# Patient Record
Sex: MALE | Race: WHITE | Employment: OTHER | URBAN - NONMETROPOLITAN AREA
[De-identification: names, ages, dates, MRNs, and addresses within clinical notes are randomized per-mention and may not be internally consistent; named-entity substitution may affect disease eponyms.]

---

## 2018-01-21 ENCOUNTER — HOSPITAL ENCOUNTER (INPATIENT)
Age: 38
LOS: 5 days | Discharge: HOME | DRG: 885 | End: 2018-01-26
Attending: EMERGENCY MEDICINE | Admitting: PSYCHIATRY & NEUROLOGY
Payer: COMMERCIAL

## 2018-01-21 DIAGNOSIS — I10 PRIMARY HYPERTENSION: ICD-10-CM

## 2018-01-21 DIAGNOSIS — F30.9 MANIA (HCC): Primary | ICD-10-CM

## 2018-01-21 DIAGNOSIS — F12.10 MARIJUANA ABUSE: ICD-10-CM

## 2018-01-21 LAB
ACETAMINOPHEN LEVEL: < 5 UG/ML (ref 0–20)
ALBUMIN SERPL-MCNC: 4.7 G/DL (ref 3.5–5.1)
ALP BLD-CCNC: 79 U/L (ref 38–126)
ALT SERPL-CCNC: 25 U/L (ref 11–66)
AMPHETAMINE+METHAMPHETAMINE URINE SCREEN: NEGATIVE
ANION GAP SERPL CALCULATED.3IONS-SCNC: 16 MEQ/L (ref 8–16)
AST SERPL-CCNC: 40 U/L (ref 5–40)
BACTERIA: ABNORMAL /HPF
BARBITURATE QUANTITATIVE URINE: NEGATIVE
BASOPHILS # BLD: 0.6 %
BASOPHILS ABSOLUTE: 0.1 THOU/MM3 (ref 0–0.1)
BENZODIAZEPINE QUANTITATIVE URINE: NEGATIVE
BILIRUB SERPL-MCNC: 0.5 MG/DL (ref 0.3–1.2)
BILIRUBIN URINE: NEGATIVE
BLOOD, URINE: NEGATIVE
BUN BLDV-MCNC: 12 MG/DL (ref 7–22)
CALCIUM SERPL-MCNC: 9.8 MG/DL (ref 8.5–10.5)
CANNABINOID QUANTITATIVE URINE: POSITIVE
CASTS 2: ABNORMAL /LPF
CASTS UA: ABNORMAL /LPF
CHARACTER, URINE: ABNORMAL
CHLORIDE BLD-SCNC: 98 MEQ/L (ref 98–111)
CO2: 25 MEQ/L (ref 23–33)
COCAINE METABOLITE QUANTITATIVE URINE: NEGATIVE
COLOR: YELLOW
CREAT SERPL-MCNC: 0.9 MG/DL (ref 0.4–1.2)
CRYSTALS, UA: ABNORMAL
EOSINOPHIL # BLD: 0.9 %
EOSINOPHILS ABSOLUTE: 0.1 THOU/MM3 (ref 0–0.4)
EPITHELIAL CELLS, UA: ABNORMAL /HPF
ETHYL ALCOHOL, SERUM: < 0.01 %
GFR SERPL CREATININE-BSD FRML MDRD: > 90 ML/MIN/1.73M2
GLUCOSE BLD-MCNC: 104 MG/DL (ref 70–108)
GLUCOSE URINE: NEGATIVE MG/DL
HCT VFR BLD CALC: 46.5 % (ref 42–52)
HEMOGLOBIN: 16.1 GM/DL (ref 14–18)
KETONES, URINE: NEGATIVE
LEUKOCYTE ESTERASE, URINE: NEGATIVE
LYMPHOCYTES # BLD: 11.5 %
LYMPHOCYTES ABSOLUTE: 1.6 THOU/MM3 (ref 1–4.8)
MCH RBC QN AUTO: 30 PG (ref 27–31)
MCHC RBC AUTO-ENTMCNC: 34.6 GM/DL (ref 33–37)
MCV RBC AUTO: 86.6 FL (ref 80–94)
MISCELLANEOUS 2: ABNORMAL
MONOCYTES # BLD: 4.6 %
MONOCYTES ABSOLUTE: 0.6 THOU/MM3 (ref 0.4–1.3)
NITRITE, URINE: NEGATIVE
NUCLEATED RED BLOOD CELLS: 0 /100 WBC
OPIATES, URINE: NEGATIVE
OSMOLALITY CALCULATION: 277.6 MOSMOL/KG (ref 275–300)
OXYCODONE: NEGATIVE
PDW BLD-RTO: 12.8 % (ref 11.5–14.5)
PH UA: 7.5
PHENCYCLIDINE QUANTITATIVE URINE: NEGATIVE
PLATELET # BLD: 333 THOU/MM3 (ref 130–400)
PMV BLD AUTO: 7.3 MCM (ref 7.4–10.4)
POTASSIUM SERPL-SCNC: 4.1 MEQ/L (ref 3.5–5.2)
PROTEIN UA: NEGATIVE
RBC # BLD: 5.37 MILL/MM3 (ref 4.7–6.1)
RBC URINE: ABNORMAL /HPF
RENAL EPITHELIAL, UA: ABNORMAL
SALICYLATE, SERUM: < 0.3 MG/DL (ref 2–10)
SEG NEUTROPHILS: 82.4 %
SEGMENTED NEUTROPHILS ABSOLUTE COUNT: 11.5 THOU/MM3 (ref 1.8–7.7)
SODIUM BLD-SCNC: 139 MEQ/L (ref 135–145)
SPECIFIC GRAVITY, URINE: < 1.005 (ref 1–1.03)
TOTAL PROTEIN: 7.9 G/DL (ref 6.1–8)
TSH SERPL DL<=0.05 MIU/L-ACNC: 2.58 UIU/ML (ref 0.4–4.2)
UROBILINOGEN, URINE: 0.2 EU/DL
WBC # BLD: 13.9 THOU/MM3 (ref 4.8–10.8)
WBC UA: ABNORMAL /HPF
YEAST: ABNORMAL

## 2018-01-21 PROCEDURE — 84443 ASSAY THYROID STIM HORMONE: CPT

## 2018-01-21 PROCEDURE — 99284 EMERGENCY DEPT VISIT MOD MDM: CPT

## 2018-01-21 PROCEDURE — 85025 COMPLETE CBC W/AUTO DIFF WBC: CPT

## 2018-01-21 PROCEDURE — G0480 DRUG TEST DEF 1-7 CLASSES: HCPCS

## 2018-01-21 PROCEDURE — 36415 COLL VENOUS BLD VENIPUNCTURE: CPT

## 2018-01-21 PROCEDURE — 80307 DRUG TEST PRSMV CHEM ANLYZR: CPT

## 2018-01-21 PROCEDURE — 80053 COMPREHEN METABOLIC PANEL: CPT

## 2018-01-21 PROCEDURE — 1240000000 HC EMOTIONAL WELLNESS R&B

## 2018-01-21 PROCEDURE — 81001 URINALYSIS AUTO W/SCOPE: CPT

## 2018-01-21 RX ORDER — TRAZODONE HYDROCHLORIDE 50 MG/1
50 TABLET ORAL NIGHTLY PRN
Status: DISCONTINUED | OUTPATIENT
Start: 2018-01-21 | End: 2018-01-26 | Stop reason: HOSPADM

## 2018-01-21 RX ORDER — LORAZEPAM 2 MG/ML
2 INJECTION INTRAMUSCULAR
Status: DISPENSED | OUTPATIENT
Start: 2018-01-21 | End: 2018-01-21

## 2018-01-21 RX ORDER — ZIPRASIDONE MESYLATE 20 MG/ML
20 INJECTION, POWDER, LYOPHILIZED, FOR SOLUTION INTRAMUSCULAR
Status: DISPENSED | OUTPATIENT
Start: 2018-01-21 | End: 2018-01-21

## 2018-01-21 RX ORDER — HYDROXYZINE PAMOATE 25 MG/1
25 CAPSULE ORAL 3 TIMES DAILY PRN
Status: DISCONTINUED | OUTPATIENT
Start: 2018-01-21 | End: 2018-01-26 | Stop reason: HOSPADM

## 2018-01-21 RX ORDER — NICOTINE 21 MG/24HR
1 PATCH, TRANSDERMAL 24 HOURS TRANSDERMAL EVERY 24 HOURS
Status: DISCONTINUED | OUTPATIENT
Start: 2018-01-21 | End: 2018-01-26 | Stop reason: HOSPADM

## 2018-01-21 RX ORDER — MAGNESIUM HYDROXIDE/ALUMINUM HYDROXICE/SIMETHICONE 120; 1200; 1200 MG/30ML; MG/30ML; MG/30ML
30 SUSPENSION ORAL PRN
Status: DISCONTINUED | OUTPATIENT
Start: 2018-01-21 | End: 2018-01-26 | Stop reason: HOSPADM

## 2018-01-21 RX ORDER — ACETAMINOPHEN 325 MG/1
650 TABLET ORAL EVERY 4 HOURS PRN
Status: DISCONTINUED | OUTPATIENT
Start: 2018-01-21 | End: 2018-01-26 | Stop reason: HOSPADM

## 2018-01-21 ASSESSMENT — SLEEP AND FATIGUE QUESTIONNAIRES
DO YOU HAVE DIFFICULTY SLEEPING: NO
DO YOU HAVE DIFFICULTY SLEEPING: NO
AVERAGE NUMBER OF SLEEP HOURS: 5
DO YOU USE A SLEEP AID: NO
AVERAGE NUMBER OF SLEEP HOURS: 5
DO YOU USE A SLEEP AID: NO

## 2018-01-21 ASSESSMENT — ENCOUNTER SYMPTOMS
VOMITING: 0
BLOOD IN STOOL: 0
DIARRHEA: 0
BACK PAIN: 0
CONSTIPATION: 0
COUGH: 0
WHEEZING: 0
ABDOMINAL PAIN: 0
RHINORRHEA: 0
SORE THROAT: 0
CHEST TIGHTNESS: 0
SHORTNESS OF BREATH: 0
NAUSEA: 0

## 2018-01-21 ASSESSMENT — PATIENT HEALTH QUESTIONNAIRE - PHQ9: SUM OF ALL RESPONSES TO PHQ QUESTIONS 1-9: 13

## 2018-01-21 ASSESSMENT — LIFESTYLE VARIABLES
HISTORY_ALCOHOL_USE: YES
HISTORY_ALCOHOL_USE: YES

## 2018-01-21 ASSESSMENT — PAIN SCALES - GENERAL: PAINLEVEL_OUTOF10: 0

## 2018-01-21 NOTE — ED PROVIDER NOTES
the patient's friend Manuel Blanca passed away 2 weeks ago. The HPI was provided by the patient and family. HPI from [facility]:  Alexander Estrada presents stating that he had visited his sister's house as he dose frequently and showed them the idea that he had for an intervention which uses LED lights to project messages from the rear window of your car using a Bluetooth device to communicate messages to other drivers in order to display the name of your car or other information. He states that he had a detailed notebook of this idea as well as other interventions. Patient states that he learned from [employer] sports information about transient hypo-frontality which is a known condition there which can cause altered states of consciousness. He states that he uses exercises to elicit this response which then helps him with his creativity to come up with ideas for interventions such as the one he has discussed. The patient states that he showed the idea to his family and they were concerned about his behavior and that he was not acting normally. They state \"this is just no him\" and wanted him to be evaluated for acute psychiatric illness in the emergency department. He denies any suicidal ideation or attempts and his family denies him being suicidal or homicidal. They are worried about his unusual behavior. \"    Patient declined to receive any laboratory work at [facility] and left [facility]. REVIEW OF SYSTEMS     Review of Systems   Constitutional: Negative for appetite change, chills, diaphoresis, fatigue and fever. HENT: Negative for congestion, rhinorrhea and sore throat. Respiratory: Negative for cough, chest tightness, shortness of breath and wheezing. Cardiovascular: Negative for chest pain, palpitations and leg swelling. Gastrointestinal: Negative for abdominal pain, blood in stool, constipation, diarrhea, nausea and vomiting.    Genitourinary: Negative for difficulty urinating, dysuria and hematuria. Musculoskeletal: Negative for back pain, joint swelling, neck pain and neck stiffness. Skin: Negative for pallor and rash. Neurological: Negative for dizziness, syncope, weakness, light-headedness, numbness and headaches. Hematological: Negative for adenopathy. Psychiatric/Behavioral: Negative for agitation, confusion and suicidal ideas. The patient is not nervous/anxious. Psychiatric evaluation       PAST MEDICAL HISTORY    has a past medical history of Depression. SURGICAL HISTORY      has no past surgical history on file. CURRENT MEDICATIONS       Previous Medications    No medications on file       ALLERGIES     is allergic to sulfa antibiotics. FAMILY HISTORY     has no family status information on file. family history is not on file. SOCIAL HISTORY          PHYSICAL EXAM     INITIAL VITALS:  oral temperature is 98.4 °F (36.9 °C). His blood pressure is 158/100 (abnormal) and his pulse is 93. His respiration is 18 and oxygen saturation is 98%. Physical Exam   Constitutional: He is oriented to person, place, and time. He appears well-developed and well-nourished. He is active and cooperative. Non-toxic appearance. HENT:   Head: Normocephalic and atraumatic. Right Ear: External ear normal.   Left Ear: External ear normal.   Eyes: Conjunctivae are normal. Right eye exhibits no discharge. Left eye exhibits no discharge. No scleral icterus. Neck: Normal range of motion. Neck supple. No JVD present. Pulmonary/Chest: Effort normal. No accessory muscle usage or stridor. No respiratory distress. Abdominal: Soft. He exhibits no distension. Musculoskeletal: Normal range of motion. He exhibits no edema. Neurological: He is alert and oriented to person, place, and time. He is not disoriented. He exhibits normal muscle tone. GCS eye subscore is 4. GCS verbal subscore is 5. GCS motor subscore is 6. Skin: Skin is warm and dry. He is not diaphoretic. No erythema. Psychiatric: His speech is rapid and/or pressured. He expresses no homicidal and no suicidal ideation. He expresses no suicidal plans and no homicidal plans. Patient is mildly manic currently. Nursing note and vitals reviewed. DIFFERENTIAL DIAGNOSIS:   Bipolar disorder, Anxiety,benita    DIAGNOSTIC RESULTS     EKG: All EKG's are interpreted by the Emergency Department Physician who either signs or Co-signs this chart in the absence of a cardiologist.  EKG interpreted by Altagracia Palacios MD:    None    RADIOLOGY: non-plain film images(s) such as CT, Ultrasound and MRI are read by the radiologist.    No orders to display       LABS:   Labs Reviewed   CBC WITH AUTO DIFFERENTIAL - Abnormal; Notable for the following:        Result Value    WBC 13.9 (*)     MPV 7.3 (*)     Segs Absolute 11.5 (*)     All other components within normal limits   SALICYLATE LEVEL - Abnormal; Notable for the following:     Salicylate, Serum < 0.3 (*)     All other components within normal limits   URINE WITH REFLEXED MICRO - Abnormal; Notable for the following:     Character, Urine CLOUDY (*)     All other components within normal limits   COMPREHENSIVE METABOLIC PANEL   URINE DRUG SCREEN   ETHANOL   TSH WITH REFLEX   ACETAMINOPHEN LEVEL   ANION GAP   GLOMERULAR FILTRATION RATE, ESTIMATED   OSMOLALITY       EMERGENCY DEPARTMENT COURSE:   Vitals:    Vitals:    01/21/18 1449 01/21/18 1734   BP: (!) 165/108 (!) 158/100   Pulse: 93 93   Resp: 16 18   Temp: 98.3 °F (36.8 °C) 98.4 °F (36.9 °C)   TempSrc: Oral Oral   SpO2: 99% 98%       2:59 PM: The patient was seen and evaluated. He is extremely manic. More information from family is obtained. Medical record from [facility] is obtained. He is under [facility] by me. He refused blood workups initially. He finally agreed blood workups. Medically he is cleared. TESS evaluation was done. Admission is recommended.    Admitted by Dr. Kianna Griffith to Behavioral health unit        CRITICAL CARE:   None

## 2018-01-22 PROCEDURE — 1240000000 HC EMOTIONAL WELLNESS R&B

## 2018-01-22 PROCEDURE — 90792 PSYCH DIAG EVAL W/MED SRVCS: CPT | Performed by: NURSE PRACTITIONER

## 2018-01-22 PROCEDURE — 6370000000 HC RX 637 (ALT 250 FOR IP): Performed by: PSYCHIATRY & NEUROLOGY

## 2018-01-22 RX ORDER — PALIPERIDONE 3 MG/1
3 TABLET, EXTENDED RELEASE ORAL DAILY
Status: DISCONTINUED | OUTPATIENT
Start: 2018-01-22 | End: 2018-01-26 | Stop reason: HOSPADM

## 2018-01-22 RX ORDER — DIVALPROEX SODIUM 500 MG/1
500 TABLET, EXTENDED RELEASE ORAL 2 TIMES DAILY
Status: DISCONTINUED | OUTPATIENT
Start: 2018-01-22 | End: 2018-01-24

## 2018-01-22 RX ADMIN — DIVALPROEX SODIUM 500 MG: 500 TABLET, FILM COATED, EXTENDED RELEASE ORAL at 18:17

## 2018-01-22 RX ADMIN — PALIPERIDONE 3 MG: 3 TABLET, EXTENDED RELEASE ORAL at 18:17

## 2018-01-22 NOTE — PROGRESS NOTES
Group Therapy Note    Date: 1/22/2018  Start Time: 1330  End Time:  1430  Number of Participants: 8    Type of Group: Cognitive Skills    Wellness Binder Information  Module Name:  Feelings  Session Number:  NA    Patient's Goal: NA    Notes:  Pt is present for group as the peer discussion examined the emotional recovery process and learning to better manage feelings. Pt relates well with peer experiences. Status After Intervention:  Improved    Participation Level:  Active Listener and Interactive    Participation Quality: Appropriate, Attentive, Sharing and Supportive      Speech:  normal and pressured      Thought Process/Content: Logical  Linear      Affective Functioning: Congruent      Mood: elevated      Level of consciousness:  Alert, Oriented x4 and Attentive      Response to Learning: Able to verbalize current knowledge/experience, Able to verbalize/acknowledge new learning, Able to retain information, Capable of insight, Able to change behavior and Progressing to goal      Endings: None Reported    Modes of Intervention: Education, Support, Socialization, Exploration, Clarifying, Problem-solving and Activity      Discipline Responsible: /Counselor      Signature:  DUARTE Lowe

## 2018-01-22 NOTE — PLAN OF CARE
Problem: Social interaction  Goal: Increased social interaction  Outcome: Ongoing  Patient attended one group today so far so he is working toward his socialization goal for this shift.

## 2018-01-22 NOTE — PROGRESS NOTES
BHI Biopsychosocial Assessment    Current Level of Psychosocial Functioning     Independent   Dependent    Minimal Assist   XXX    Comments:      Psychosocial High Risk Factors (check all that apply)    Unable to obtain meds   Chronic illness/pain    Substance abuse   XXX  Lack of Family Support   Financial stress   Isolation   Inadequate Community Resources  Suicide attempt(s)  Not taking medications  XXX  Victim of crime   Developmental Delay  Unable to manage personal needs    Age 72 or older   Homeless  No transportation   Readmission within 30 days  Unemployment  Traumatic Event    Comments:   Sexual Orientation:  Heterosexual    Patient Strengths: Employed, housing, self reliant    Patient Barriers: Poor insight, Marijuana use. Plan of Care     medication management, group/individual therapies, family meetings, psycho -education, treatment team meetings to assist with stabilization    Initial Discharge Plan:  On going, likely [facility] in [place]. Clinical Summary:  Elliott Etienne a 40 y. o. male who presents to the Emergency Department via EMS transport for psychiatric evaluation. Patient has a history of depression. Gaurang Alcaraz was [employer] for evaluation in [place] after he cy to visit his sister who is a nurse. Upon returning home, he had some type of conflict with his mother. Mother came from [place] and he was subsequently admitted to this unit. Gaurang Alcaraz is a poor historian and shows little insight. He is very labile in mood and affect with pressured speech. He is employed full time, has 2 children. He denies alcohol use for 2 years after being convicted of an H[employer]CTOR and placed on an alcohol monitor. He does report daily marijuana use but is unclear as to the amount. He denies any current treatment provider. Denies suicidal ideation.

## 2018-01-22 NOTE — H&P
LED lights to project messages from the rear window of one's car using a Blue tooth device to communicate messages to other drivers such as \"you are driving too fast, or you are too close to my car. \"  Apparently while brokering his thought to his sister, he started \"talking out of his mind\" and exhibiting bizarre behaviors which alerted his family enough for his mother to fly from [place] to [facility] overnight. Patient reports that he learned from \"extreme sports information about transient hypo-frontality, which is a known condition that can cause altered state of consciousness\". He reports that he \"uses exercises to elicit this response\" which then helps him with his creativity to come out with ideas for intervention such as the one discussed above. Patient reports that he is normal and that there is nothing wrong with him and that his idea through his sister off and she thought that \"there is no way a man of my status educationally can come up with such a brilliant idea\". Patient reports history of depression 2 years ago when his ex-girlfriend and government caused him to have problems. He has an ankle chain as a result of using alcohol and has had it for 2 years. He reports that 2 years ago, he was very sad and down, not enjoying things, feeling worthless, hopeless/helpless, did not sleep well with low appetite but never had thoughts to hurt himself. He denies any current thoughts to hurt himself but appears to be manic. He is talking fast/pressured speech, and forgets to breathe which then forces him to gasp for air. He is labile which is evidenced when he starts to talk and is irritable, then he will start to cry. Patient reports that he is always talkative with mood swings and in the past has engaged in stupid behaviors like going on a spending spree and sleeping around indiscriminately. Patient denies other symptoms of benita or hypomania.  He reports that he gets anxious sometimes but not able to ED--[facility]       Mental Status Examination:  Level of consciousness:  within normal limits  Appearance:  well-appearing, hospital attire, in chair, fair grooming and fair hygiene  Behavior/Motor:  Labile  Attitude toward examiner:  cooperative, attentive and good eye contact  Speech:  well articulated, rapid, loud, hyperverbal and pressured  Mood: tearfulness and restlessness  Affect:  blunted and anxious  Thought processes:  coherent, overabundance of ideas, flight of ideas and circumstantial  Thought content:  denies homicidal ideations  Suicidal Ideation:  denies suicidal ideation  Delusions:  no evidence of delusions  Perceptual Disturbance:  denies any perceptual disturbance  Cognition:  oriented to person, place, and time  Concentration succeeded  Memory intact    Insight :  Good as evidenced by patient's ability to appreciate the nature and degree of his mental illness and need for treatment  Judgment:Poor    DSM-5 Diagnosis    Psychosis NOS  Rule out bipolar disorder  Hx of MDD  Marijuana use disorder  Alcohol use disorder in full remission    Past Medical History:   Diagnosis Date    Depression         TREATMENT PLAN    Risk Management:  close watch per standard protocol      Psychotherapy:  participation in milieu and group and individual sessions with Attending Physician,  and Physician Assistant/CNP      Estimated length of stay:  2-14 days      GENERAL PATIENT/FAMILY EDUCATION  Patient will understand basic signs and symptoms, Patient will understand benefits/risks and potential side effects from proposed meds and Patient will understand their role in recovery. Family is  active in patient's care. Patient assets that may be helpful during treatment include: Intent to participate and engage in treatment, sufficient fund of knowledge and intellect to understand and utilize treatments.     Goals:    Patient states that he does not think he needs any medication but is amenable to taking anything offered by the providers here. Patient may benefit from a mood stabilizer/anti depressant  Encourage groups with interactions      Behavioral Services  Medicare Certification     Admission Day 1  I certify that this patient's inpatient psychiatric hospital admission is medically necessary for:    x (1) treatment which could reasonably be expected to improve this patient's condition, or    x (2) diagnostic study or its equivalent. Time Spent: 51 minutes     Physicians Signature:  Electronically signed by Debi Gordon DNP on 1/22/2018 at 1:06 PM     I assessed this patient and reviewed the case and plan of care with Lance Kinney CNP.   I have reviewed the above documentation and I agree with the findings and treatment plan  as written    Patient accepts to take medication per my recommendation as suggested for mood stabilization  Will start Invega & Depakote    Mother called and I spoke with her, drug sister Alona Cox)  Electronically signed by Gabe Mcrae. on 1/22/2018 at 2:07 PM

## 2018-01-22 NOTE — PROGRESS NOTES
Nutrition Assessment    Type and Reason for Visit: Initial, Positive Nutrition Screen, Consult (unplanned weight loss)    Nutrition Recommendations: Continue current diet. Malnutrition Assessment:  · Malnutrition Status: Insufficient data  · Findings of the 6 clinical characteristics of malnutrition (Minimum of 2 out of 6 clinical characteristics is required to make the diagnosis of moderate or severe Protein Calorie Malnutrition based on AND/ASPEN Guidelines):  1. Energy Intake-Greater than 75%,      2. Weight Loss-Unable to assess (no written weight records available ),    3. Fat Loss-Unable to assess,    4. Muscle Loss-Unable to assess,      Nutrition Diagnosis:   · Problem: Increased nutrient needs  · Etiology: related to Increased demand for energy/nutrients due to     Signs and symptoms:  as evidenced by Patient report of (weight loss in the past 3 months (12% loss))    Nutrition Assessment:  · Subjective Assessment: Patient admit with benita. Patient seen earlier today in his room- reports has lost ~10# in  the past 3 months by working so hard to support him and his child, reports having a great appetite, states ate double portions at breakfast today. Delinces trialing oral nutrition supplement. Patient states \"I am tired of answering questions\".    · Wound Type: None  · Current Nutrition Therapies:  · Oral Diet Orders: General   · Oral Diet intake: %  · Oral Nutrition Supplement (ONS) Orders: None (patient declines)  · Anthropometric Measures:  · Ht: 5' 9\" (175.3 cm)   · Current Body Wt: 132 lb (59.9 kg)  · Admission Body Wt: 132 lb (59.9 kg) (standing scale 1/21/18)  · Usual Body Wt:  (150# 3 months ago per patient, no EHR to confirm weight loss)  · Ideal Body Wt: 160 lb (72.6 kg), % Ideal Body 83%  · BMI Classification: BMI 18.5 - 24.9 Normal Weight  · Comparative Standards (Estimated Nutrition Needs):  · Estimated Daily Total Kcal: 6027-7736  · Estimated Daily Protein (g): 60-72    Estimated Intake vs Estimated Needs: Insufficient Data    Nutrition Risk Level: Moderate    Nutrition Interventions:   Continue current diet  Continued Inpatient Monitoring, Education not appropriate at this time    Nutrition Evaluation:   · Evaluation: Goals set   · Goals: Patient will consume 75% or greater of meals during LOS. · Monitoring: Meal Intake, Diet Tolerance, Weight, Pertinent Labs    See Adult Nutrition Doc Flowsheet for more detail.      Electronically signed by Stacey Chavez RD, LD on 1/22/18 at 10:24 AM    Contact Number: [phone]

## 2018-01-23 PROCEDURE — 1240000000 HC EMOTIONAL WELLNESS R&B

## 2018-01-23 PROCEDURE — 99231 SBSQ HOSP IP/OBS SF/LOW 25: CPT | Performed by: PSYCHIATRY & NEUROLOGY

## 2018-01-23 PROCEDURE — 6370000000 HC RX 637 (ALT 250 FOR IP): Performed by: PSYCHIATRY & NEUROLOGY

## 2018-01-23 RX ADMIN — DIVALPROEX SODIUM 500 MG: 500 TABLET, FILM COATED, EXTENDED RELEASE ORAL at 08:45

## 2018-01-23 RX ADMIN — PALIPERIDONE 3 MG: 3 TABLET, EXTENDED RELEASE ORAL at 08:45

## 2018-01-23 RX ADMIN — DIVALPROEX SODIUM 500 MG: 500 TABLET, FILM COATED, EXTENDED RELEASE ORAL at 20:45

## 2018-01-23 NOTE — PROGRESS NOTES
are too close to my car. \"  Apparently while brokering his thought to his sister, he started \"talking out of his mind\" and exhibiting bizarre behaviors which alerted his family enough for his mother to fly from [place] to [facility] overnight. Patient reports that he learned from \"extreme sports information about transient hypo-frontality, which is a known condition that can cause altered state of consciousness\". He reports that he \"uses exercises to elicit this response\" which then helps him with his creativity to come out with ideas for intervention such as the one discussed above. Patient reports that he is normal and that there is nothing wrong with him and that his idea through his sister off and she thought that \"there is no way a man of my status educationally can come up with such a brilliant idea\". Patient reports history of depression 2 years ago when his ex-girlfriend and government caused him to have problems. He has an ankle chain as a result of using alcohol and has had it for 2 years. He reports that 2 years ago, he was very sad and down, not enjoying things, feeling worthless, hopeless/helpless, did not sleep well with low appetite but never had thoughts to hurt himself. He denies any current thoughts to hurt himself but appears to be manic. He is talking fast/pressured speech, and forgets to breathe which then forces him to gasp for air. He is labile which is evidenced when he starts to talk and is irritable, then he will start to cry. Patient reports that he is always talkative with mood swings and in the past has engaged in stupid behaviors like going on a spending spree and sleeping around indiscriminately. Patient denies other symptoms of benita or hypomania. He reports that he gets anxious sometimes but not able to explain his symptoms.  He denies any paranoia but then acknowledges that he thought he was being poisoned on admission and would not consume anything except a family member consumes that. He also refused initially to provide blood or urine for lab work because \"I don't want the government to have my specimen. \" He states that he was suspicious of the physician that saw him at the emergency room and did not trust him because he did not want his DNA taken. Patient denies SI/HI, and no hallucinations. Patient is pleasantly manic and is asking to be discharged. He  did test positive for marijuana only. Reports daily use-a 8 joint daily. Experimented with cocaine, mushrooms and ecstasy in the past.  Patient had a history of alcohol abuse but has not used any since 2 years. He did not want writer to call his family for collateral because he doesn't believe that they will tell the truth. Progress:  Patient seen with his family (sisters and mother). We went over the circumstance leading to his admission and the illness and treatment options. Patient says he is doing better. He has been taking his medications with no SEs.       OBJECTIVE    Physical  VITALS:    BP (!) 168/96   Pulse 60   Temp 97.2 °F (36.2 °C) (Oral)   Resp 16   Ht 5' 9\" (1.753 m)   Wt 132 lb (59.9 kg)   SpO2 97%   BMI 19.49 kg/m²       Medications  Current Facility-Administered Medications: paliperidone (INVEGA) extended release tablet 3 mg, 3 mg, Oral, Daily  divalproex (DEPAKOTE ER) extended release tablet 500 mg, 500 mg, Oral, BID  [] ziprasidone (GEODON) injection 20 mg, 20 mg, Intramuscular, Once PRN **AND** sterile water injection 1.2 mL, 1.2 mL, Intramuscular, Once  acetaminophen (TYLENOL) tablet 650 mg, 650 mg, Oral, Q4H PRN  hydrOXYzine (VISTARIL) capsule 25 mg, 25 mg, Oral, TID PRN  traZODone (DESYREL) tablet 50 mg, 50 mg, Oral, Nightly PRN  magnesium hydroxide (MILK OF MAGNESIA) 400 MG/5ML suspension 30 mL, 30 mL, Oral, Daily PRN  aluminum & magnesium hydroxide-simethicone (MAALOX) 200-200-20 MG/5ML suspension 30 mL, 30 mL, Oral, PRN  nicotine (NICODERM CQ) 21 MG/24HR 1 patch, 1 patch,

## 2018-01-23 NOTE — PROGRESS NOTES
Pt's mother called asking if pt's 8 yr old son could visit, states that Gaby Quinones said all he had to do was sign a paper and the child could come. I told the mother that we have a hospital wide policy stating no children under 14 could visit and that we need a dr's order for children to visit the unit.

## 2018-01-23 NOTE — PLAN OF CARE
Problem: Altered Mood, Manic Behavior  Goal: LTG-Able to sleep  Outcome: Ongoing  Pt refused offer of trazodone at bedtime, states he is sleeping good  Goal: LTG-Able to verbalize decrease in frequency and intensity of racing thoughts  Outcome: Ongoing  Pt denies racing thoughts, has rapid, pressured speech  Goal: STG-Able to verbalize suicidal ideations  Outcome: Completed Date Met: 01/23/18  Pt denies suicidal thoughts  Goal: LTG-Absence of self-harm  Outcome: Met This Shift  No self harm  Goal: STG-Adequate nutritional intake  Outcome: Ongoing  Pt ate a snack this evening  Goal: LTG-Appropriate social interaction  Outcome: Ongoing  Pt can be intrusive with peers at times, irritable with staff and thinks staff is ignorant and does not have his high intelligence, appears paranoid of staff and his family  Goal: LTG-Mood stable  Outcome: Not Met This Shift  Rates mood great, pt is exaggerated, labile, tearful at times, clean appearance, good eye contact, speech is rapid and pressured, pt did come to staff later in the evening and stated he had an epiphany after talking to a male peer who is manic, peer told pt that I know you don't want to hear this but you are manic, pt apologized to staff for his behavior and states he has been wrong, that he is manic and needs help    Problem: Altered Mood, Psychotic Behavior  Goal: LTG-Able to verbalize reality based thinking  Outcome: Ongoing  Pt is grandiose and paranoid    Problem: Substance Abuse  Goal: STG-Knowledge of positive coping patterns  Outcome: Ongoing  Encouraged pt to attend groups to learn positive coping    Problem: Discharge Planning:  Goal: Discharged to appropriate level of care  Discharged to appropriate level of care   Outcome: Ongoing  Pt plans to return home with his kids at d/c    Problem: KNOWLEDGE DEFICIT,EDUCATION,DISCHARGE PLAN  Goal: Knowledge - personal safety  Outcome: Ongoing  Pt did not work on safety plan this shift    Comments: Care plan

## 2018-01-23 NOTE — PLAN OF CARE
Problem: Altered Mood, Manic Behavior  Intervention: Nutritional intake assessment  Patient with adequate nutritional intake. Will continue to monitor. Intervention: Education, Parris Fuchs of illness  Will continue to educate on nature of illness as is accepted. Intervention: Group participation promotion  Will continue to promote group participation. Intervention: Reduce environmental stimuli  Will continue to reduce environmental stimuli. Goal: LTG-Able to sleep  Outcome: Met This Shift  Patient reports to writer he is sleeping better than he was. Goal: LTG-Absence of self-harm  Outcome: Met This Shift  No self harm behaviors were observed or reported so far this shift. Remains on every 15 minutes precautions for safety. Goal: STG-Adequate nutritional intake  Outcome: Met This Shift  Patient having adequate nutritional intake at this time. Goal: LTG-Appropriate social interaction  Outcome: Met This Shift  Patient is demonstrating appropriate social interaction this shift. Goal: LTG-Mood stable  Outcome: Ongoing  Patient's mood is a bit more stable today than yesterday. Voices feeling a lot more stable today as well. Problem: Discharge Planning:  Intervention: Assess readiness for discharge  Will continue to assess readiness for discharge. Dr Adiel Chaudhry is looking at possibly this coming Friday. Patient is aware. Goal: Discharged to appropriate level of care  Discharged to appropriate level of care   Outcome: Ongoing  Patient will be discharged at an appropriately LOC when ready. Problem: Falls - Risk of:  Goal: Safety behavior - fall prevention  Patient and caregivers take actions to minimize risk factors that  might lead to falls. Outcome: Met This Shift  Will promote continued fall prevention. Comments: Care plan reviewed with patient. Patient verbalizes understanding   of the plan of care and contributes to goal setting.

## 2018-01-23 NOTE — PLAN OF CARE
Problem: Social interaction  Goal: Increased social interaction  Outcome: Ongoing  Patient has attended at least 2 groups today so he is working toward his socialization goal for this shift.

## 2018-01-24 LAB — VALPROIC ACID LEVEL: 43.6 UG/ML (ref 50–100)

## 2018-01-24 PROCEDURE — 36415 COLL VENOUS BLD VENIPUNCTURE: CPT

## 2018-01-24 PROCEDURE — 80164 ASSAY DIPROPYLACETIC ACD TOT: CPT

## 2018-01-24 PROCEDURE — 1240000000 HC EMOTIONAL WELLNESS R&B

## 2018-01-24 PROCEDURE — 6370000000 HC RX 637 (ALT 250 FOR IP): Performed by: PSYCHIATRY & NEUROLOGY

## 2018-01-24 PROCEDURE — 99231 SBSQ HOSP IP/OBS SF/LOW 25: CPT | Performed by: PSYCHIATRY & NEUROLOGY

## 2018-01-24 RX ADMIN — DIVALPROEX SODIUM 750 MG: 500 TABLET, FILM COATED, EXTENDED RELEASE ORAL at 09:41

## 2018-01-24 RX ADMIN — PALIPERIDONE 3 MG: 3 TABLET, EXTENDED RELEASE ORAL at 09:41

## 2018-01-24 RX ADMIN — DIVALPROEX SODIUM 750 MG: 500 TABLET, FILM COATED, EXTENDED RELEASE ORAL at 21:01

## 2018-01-24 ASSESSMENT — PAIN SCALES - GENERAL: PAINLEVEL_OUTOF10: 0

## 2018-01-24 NOTE — PROGRESS NOTES
This RN has reviewed and agrees with Zach Lynne LPN's data collection and has collaborated with this LPN regarding the patient's care plan.

## 2018-01-24 NOTE — PROGRESS NOTES
(denies)    Daily Assessment Last Entry:   Daily Sleep (WDL): Within Defined Limits         Patient Currently in Pain: Denies  Daily Nutrition (WDL): Within Defined Limits    Patient Monitoring:  Frequency of Checks: 4 times per hour, close    Psychiatric Symptoms:   Depression Symptoms  Depression Symptoms: No problems reported or observed. Anxiety Symptoms  Anxiety Symptoms: No problems reported or observed. Yamilex Symptoms  Yamilex Symptoms: Grandiosity, Increased energy     Psychosis Symptoms  Delusion Type: No problems reported or observed. Suicide Risk CSSR-S:  1) Within the past month, have you wished you were dead or wished you could go to sleep and not wake up? : NO  2) Within the past month, have you actually had any thoughts of killing yourself? : NO  6)  Have you ever done anything, started to do anything, or prepared to do anything to end your life?: NO        EDUCATION:   Learner Progress Toward Treatment Goals: Reviewed results and recommendations of this team    Method: Small group    Outcome: Verbalized understanding    PATIENT GOALS: \"stabilizing mood\"  1. How are you progressing toward meeting your main treatment goal? Patient is progressing, reports feeling much better   2. Are there discharge barriers/lingering problems that need to be addressed? None  3. How is your group participation?   Patient has been attending groups    PLAN/TREATMENT RECOMMENDATIONS UPDATE: Medication management, supportive therapy, discharge planning    SHORT-TERM GOALS UPDATE:   Time frame for Short-Term Goals: Ongoing    LONG-TERM GOALS UPDATE:   Time frame for Long-Term Goals: Discharge and ongoing   Members Present in Team Meeting: See Signature Sheet    [employer]

## 2018-01-24 NOTE — PROGRESS NOTES
consumes that. He also refused initially to provide blood or urine for lab work because \"I don't want the government to have my specimen. \" He states that he was suspicious of the physician that saw him at the emergency room and did not trust him because he did not want his DNA taken. Patient denies SI/HI, and no hallucinations. Patient is pleasantly manic and is asking to be discharged. He  did test positive for marijuana only. Reports daily use-a 8 joint daily. Experimented with cocaine, mushrooms and ecstasy in the past.  Patient had a history of alcohol abuse but has not used any since 2 years. He did not want writer to call his family for collateral because he doesn't believe that they will tell the truth. Progress:  Patient seen with the treatment team.  He says he is doing better in his mood, sleeping better, appetite is good, thinks the medications are working better. Says he now realizes he has bipolar all his life. He has been taking his medications with no SEs.       OBJECTIVE    Physical  VITALS:    /71   Pulse 52   Temp 96.6 °F (35.9 °C) (Oral)   Resp 16   Ht 5' 9\" (1.753 m)   Wt 132 lb (59.9 kg)   SpO2 100%   BMI 19.49 kg/m²     Medications  Current Facility-Administered Medications: divalproex (DEPAKOTE ER) extended release tablet 750 mg, 750 mg, Oral, BID  paliperidone (INVEGA) extended release tablet 3 mg, 3 mg, Oral, Daily  [] ziprasidone (GEODON) injection 20 mg, 20 mg, Intramuscular, Once PRN **AND** sterile water injection 1.2 mL, 1.2 mL, Intramuscular, Once  acetaminophen (TYLENOL) tablet 650 mg, 650 mg, Oral, Q4H PRN  hydrOXYzine (VISTARIL) capsule 25 mg, 25 mg, Oral, TID PRN  traZODone (DESYREL) tablet 50 mg, 50 mg, Oral, Nightly PRN  magnesium hydroxide (MILK OF MAGNESIA) 400 MG/5ML suspension 30 mL, 30 mL, Oral, Daily PRN  aluminum & magnesium hydroxide-simethicone (MAALOX) 200-200-20 MG/5ML suspension 30 mL, 30 mL, Oral, PRN  nicotine (NICODERM CQ) 21 MG/24HR 1

## 2018-01-24 NOTE — PLAN OF CARE
Problem: Altered Mood, Manic Behavior  Goal: LTG-Able to sleep  Outcome: Completed Date Met: 01/23/18  Pt states he is sleeping good  Goal: LTG-Able to verbalize decrease in frequency and intensity of racing thoughts  Outcome: Met This Shift  Pt states he had racing thoughts in the morning when he woke up but was much better after taking medications, pt denies racing thoughts at present  Goal: LTG-Absence of self-harm  Outcome: Met This Shift  No self harm  Goal: STG-Adequate nutritional intake  Outcome: Completed Date Met: 01/23/18  Pt states he is eating good, ate a snack this evening  Goal: LTG-Appropriate social interaction  Outcome: Met This Shift  Pt had good peer interaction this evening  Goal: LTG-Mood stable  Outcome: Ongoing  Rates mood 10/10, pt states I know I am manic, I am trying to chart out how many laps it would take to walk a mile on the unit,bright affect, good eye contact, speech is rapid, feels better and feels meds are working    Problem: Altered Mood, Psychotic Behavior  Goal: LTG-Able to verbalize reality based thinking  Outcome: Met This Shift  No delusions voiced    Problem: Substance Abuse  Goal: STG-Knowledge of positive coping patterns  Outcome: Ongoing  Pt verbalized cooking with his kids and cleaning as coping    Problem: Discharge Planning:  Goal: Discharged to appropriate level of care  Discharged to appropriate level of care   Outcome: Ongoing  Pt plans to return home with his kids    Problem: Falls - Risk of:  Goal: Safety behavior - fall prevention  Patient and caregivers take actions to minimize risk factors that  might lead to falls. Outcome: Met This Shift  No fall    Problem: KNOWLEDGE DEFICIT,EDUCATION,DISCHARGE PLAN  Goal: Knowledge - personal safety    Intervention: Educate safety plan  Pt did not work on safety plan this shift      Problem: Nutrition  Goal: Optimal nutrition therapy  Outcome: Met This Shift      Comments: Care plan reviewed with patient.   Patient does verbalize understanding of the plan of care and does contribute to goal setting

## 2018-01-24 NOTE — PLAN OF CARE
Problem: Altered Mood, Manic Behavior  Goal: LTG-Able to verbalize decrease in frequency and intensity of racing thoughts  Outcome: Ongoing  Patient continues to verbalize racing thoughts but reports they are much better than at time of admission. Goal: LTG-Absence of self-harm  Outcome: Ongoing  Patient remains free from self-harming behavior. Goal: LTG-Appropriate social interaction  Outcome: Ongoing  Patient has good interaction with staff and peers. Goal: LTG-Mood stable  Outcome: Ongoing  Patient reports mood rated 10/10, denies anxiety and depression. Problem: Altered Mood, Psychotic Behavior  Goal: LTG-Able to verbalize reality based thinking  Outcome: Ongoing  Patient continues to work on verbalization of reality based thinking. Problem: Substance Abuse  Goal: STG-Knowledge of positive coping patterns  Outcome: Ongoing  Patient reports knowledge of positive coping skills. Patient states coping skills include talking to people, watching TV, cooking and cleaning, and talking with friends or family. Problem: Discharge Planning:  Goal: Discharged to appropriate level of care  Discharged to appropriate level of care   Outcome: Ongoing  No discharge plans noted at this time. Problem: Falls - Risk of:  Goal: Safety behavior - fall prevention  Patient and caregivers take actions to minimize risk factors that  might lead to falls. Outcome: Ongoing  Patient remained free from falls at this time during shift. Problem: KNOWLEDGE DEFICIT,EDUCATION,DISCHARGE PLAN  Goal: Knowledge - personal safety  Outcome: Ongoing  Patient did not complete safety plan during shift. Problem: Nutrition  Goal: Optimal nutrition therapy  Outcome: Ongoing  Good appetite noted. Patient eating % of meals. Comments: Care plan reviewed with patient.   Patient does verbalize understanding of the plan of care and does contribute to goal setting

## 2018-01-25 PROBLEM — F30.9 MANIA (HCC): Status: RESOLVED | Noted: 2018-01-21 | Resolved: 2018-01-25

## 2018-01-25 PROCEDURE — 6370000000 HC RX 637 (ALT 250 FOR IP): Performed by: PSYCHIATRY & NEUROLOGY

## 2018-01-25 PROCEDURE — 1240000000 HC EMOTIONAL WELLNESS R&B

## 2018-01-25 PROCEDURE — 99231 SBSQ HOSP IP/OBS SF/LOW 25: CPT | Performed by: PSYCHIATRY & NEUROLOGY

## 2018-01-25 RX ADMIN — DIVALPROEX SODIUM 750 MG: 500 TABLET, FILM COATED, EXTENDED RELEASE ORAL at 21:24

## 2018-01-25 RX ADMIN — DIVALPROEX SODIUM 750 MG: 500 TABLET, FILM COATED, EXTENDED RELEASE ORAL at 08:20

## 2018-01-25 RX ADMIN — PALIPERIDONE 3 MG: 3 TABLET, EXTENDED RELEASE ORAL at 08:20

## 2018-01-25 ASSESSMENT — PAIN SCALES - GENERAL: PAINLEVEL_OUTOF10: 0

## 2018-01-25 NOTE — FLOWSHEET NOTE
Spiritual Support Group Note    Number of Participants in Group: 13                             Time: 1430    Goal: Relief from isolation and loneliness             Arline Sharing             Self-understanding and gain insight              Acceptance and belonging            Recognize they are not alone                Socialization             Empowerment       Encouragement    Topic:  [] Spiritual Wellness and Self Care                  [] Hope                     [x] Connecting with Divine/Others        [] Thankfulness and Gratitude               [x]  Meaningfulness and Purpose               [x] Forgiveness               [] Peace               [] Connect to [employer]     [] Other:    Participation Level:   [] Active Listener   [x] Minimal   [] Monopolizing   [x] Interactive   [] No Participation   []  Other:     Attention:   [x] Alert   [] Distractible   [] Drowsy   [] Poor   [] Other:    Manner:   [x] Cooperative   [] Suspicious   [] Withdrawn   [] Guarded   [] Irritable   [] Inhospitable   [] Other:     Others Comments from Group: Monica Haddadletty participated in the spirituality group. He/she learned the following spiritual need:  Belonging, meaning and purpose, acceptance, values, awe. He/she has the  mini ways of wellness paper. Participated well and answered questions as asked. This was a big group, so he did not share much.

## 2018-01-25 NOTE — PLAN OF CARE
Problem: Altered Mood, Manic Behavior  Goal: LTG-Able to verbalize decrease in frequency and intensity of racing thoughts  Outcome: Ongoing  Pt reports more concentrated thoughts, denies racing thoughts. Goal: LTG-Absence of self-harm  Outcome: Ongoing  Pt remains safe and free of harm  Goal: LTG-Appropriate social interaction  Outcome: Ongoing  Pt out on unit interacting well with peers and staff  Goal: LTG-Mood stable  Outcome: Ongoing  Pt reports mood is 10/10 and that he feels great    Problem: Altered Mood, Psychotic Behavior  Goal: LTG-Able to verbalize reality based thinking  Outcome: Ongoing  Alert and oriented to all spheres, pt verbalizes understanding of signs and symptoms    Problem: Substance Abuse  Goal: STG-Knowledge of positive coping patterns  Outcome: Ongoing  Pt reports talking with peers and staff has made a positive impact on his coping and understanding of his illness    Problem: Discharge Planning:  Goal: Discharged to appropriate level of care  Discharged to appropriate level of care   Outcome: Ongoing  Pt plans to be discharged home Friday and to follow with [facility]    Problem: Falls - Risk of:  Goal: Safety behavior - fall prevention  Patient and caregivers take actions to minimize risk factors that  might lead to falls. Outcome: Ongoing  Pt remains safe and free of harm    Problem: KNOWLEDGE DEFICIT,EDUCATION,DISCHARGE PLAN  Goal: Knowledge - personal safety  Outcome: Ongoing  Pt remains safe and free of harm    Problem: Nutrition  Goal: Optimal nutrition therapy  Outcome: Ongoing  Pt eating well and has 100% of snack    Comments: Care plan reviewed with patient . Patient  verbalize understanding of the plan of care and contribute to goal setting.

## 2018-01-25 NOTE — PROGRESS NOTES
directed, coherent  Thought content: denies suicidal or homicidal ideations, denies hallucinations or delusions, does not appear to be responding to internal stimuli   Memory: Age appropriate  Insight & Judgement: Improving  Medication side effects:  Denies      DSM-5 Diagnosis  Bipolar disorder  Marijuana use disorder  Alcohol use disorder in full remission     PLAN    Continue all meds and care  Discharge today or tomorrow  Serum Depakote levels tomorrow morning. If discharged today, will need an order for OP testing    Electronically signed by John Ellis PA-C on 1/25/2018 at 3:25 PM time spent 24 minutes     I assessed this patient and [facility] I have reviewed the above documentation and I agree with the findings and treatment &  discharge plan as written    Patient says his thoughts are much mellowed with the medications adjusted, ready to go home.   No suicide thoughts or suicide thoughts    Discharge in am to be followed up by [facility]  To have depakote level in am  Electronically signed by Tremaine Pozo on 1/25/2018 at 6:01 PM

## 2018-01-25 NOTE — PLAN OF CARE
Problem: Social interaction  Goal: Increased social interaction  Outcome: Met This Shift  Patient has attended all of the groups today so he has met his socialization goal for this shift.

## 2018-01-26 VITALS
SYSTOLIC BLOOD PRESSURE: 134 MMHG | TEMPERATURE: 96.2 F | RESPIRATION RATE: 16 BRPM | HEART RATE: 65 BPM | OXYGEN SATURATION: 98 % | WEIGHT: 132 LBS | BODY MASS INDEX: 19.55 KG/M2 | DIASTOLIC BLOOD PRESSURE: 78 MMHG | HEIGHT: 69 IN

## 2018-01-26 LAB — VALPROIC ACID LEVEL: 58.1 UG/ML (ref 50–100)

## 2018-01-26 PROCEDURE — 5130000000 HC BRIDGE APPOINTMENT

## 2018-01-26 PROCEDURE — 99238 HOSP IP/OBS DSCHRG MGMT 30/<: CPT | Performed by: PSYCHIATRY & NEUROLOGY

## 2018-01-26 PROCEDURE — 36415 COLL VENOUS BLD VENIPUNCTURE: CPT

## 2018-01-26 PROCEDURE — 6370000000 HC RX 637 (ALT 250 FOR IP): Performed by: PSYCHIATRY & NEUROLOGY

## 2018-01-26 PROCEDURE — 80164 ASSAY DIPROPYLACETIC ACD TOT: CPT

## 2018-01-26 RX ORDER — PALIPERIDONE 3 MG/1
3 TABLET, EXTENDED RELEASE ORAL DAILY
Qty: 30 TABLET | Refills: 2 | Status: SHIPPED | OUTPATIENT
Start: 2018-01-26 | End: 2022-01-20

## 2018-01-26 RX ORDER — DIVALPROEX SODIUM 250 MG/1
750 TABLET, EXTENDED RELEASE ORAL 2 TIMES DAILY
Qty: 30 TABLET | Refills: 2 | Status: SHIPPED | OUTPATIENT
Start: 2018-01-26 | End: 2022-01-20

## 2018-01-26 RX ADMIN — PALIPERIDONE 3 MG: 3 TABLET, EXTENDED RELEASE ORAL at 08:13

## 2018-01-26 RX ADMIN — DIVALPROEX SODIUM 750 MG: 500 TABLET, FILM COATED, EXTENDED RELEASE ORAL at 08:13

## 2018-01-26 ASSESSMENT — PAIN SCALES - GENERAL: PAINLEVEL_OUTOF10: 0

## 2018-01-26 NOTE — DISCHARGE SUMMARY
rate, normal volume and well articulated  Mood:  euthymic  Affect:  mood congruent  Thought processes:  linear, goal directed and coherent  Thought content:  Homocidal ideation denies  Suicidal Ideation:  denies suicidal ideation  Delusions:  no evidence of delusions  Perceptual Disturbance:  denies any perceptual disturbance  Cognition:  In tact  Memory: age appropriate  Insight & Judgement: fair  Medication side effects:  denies     Disposition: Home     Patient Instructions:     1. Advised to comply with medications as prescribed  2. Folow up with community provider    Follow-up as scheduled with [facility] in [place]     Time Spent: 20 minutes    Engagement: Patient displayed a good level of engagement with the treatments offered during this admission.      Signed: Electronically signed by Denzel Walter MD on 1/26/2018 at 6:58 AM

## 2018-01-26 NOTE — BH NOTE
Group Therapy Note    Date: 1/23/2018  Start Time: 2000  End Time:  2020    Type of Group: Wrap-Up and relaxation    Patient's Goal:  Take meds, take care of things, keep being a good person    Notes:  met    Status After Intervention:  Unchanged    Participation Level:  Active Listener    Participation Quality: Attentive      Speech:  pressured      Thought Process/Content: Logical      Affective Functioning: Exaggerated      Mood: elevated      Level of consciousness:  Alert      Response to Learning: Able to verbalize current knowledge/experience      Endings: None Reported    Modes of Intervention: Socialization      Discipline Responsible: Registered Nurse      Signature:  Deepthi Wallace RN
Group Therapy Note    Date: 1/24/2018  Start Time: 2000  End Time:  2020  Number of Participants: 1    Type of Group: Wrap-Up    Wellness Binder Information  Module Name:    Session Number:      Patient's Goal:  To work on being discharged home    Notes:  Pt reports he understands reason for staying until Friday and accepts it    Status After Intervention:  Unchanged    Participation Level: Active Listener    Participation Quality: Appropriate      Speech:  normal      Thought Process/Content: Logical      Affective Functioning: Congruent      Mood: bright      Level of consciousness:  Oriented x4      Response to Learning: Able to verbalize current knowledge/experience      Endings: None Reported    Modes of Intervention: Support      Discipline Responsible: Registered Nurse      Signature:   Leslie Curiel RN
Group Therapy Note    Date: 1/25/2018  Start Time: 2000   End Time:  2020  Number of Participants: 1    Type of Group: Wrap-Up    Wellness Binder Information  Module Name:    Session Number:      Patient's Goal:  To get more information to educate friends and family    Notes:  Pt reports he met his goal and is ready for discharge    Status After Intervention:  Unchanged    Participation Level: Active Listener    Participation Quality: Appropriate      Speech:  normal      Thought Process/Content: Logical      Affective Functioning: Congruent      Mood: bright      Level of consciousness:  Oriented x4      Response to Learning: Able to verbalize current knowledge/experience      Endings: None Reported    Modes of Intervention: Socialization      Discipline Responsible: Registered Nurse      Signature:   Angelique Mcdonough RN
INPATIENT RECREATIONAL THERAPY  ADULT BEHAVIORAL SERVICES  EVALUATION    REFERRING PHYSICIAN:   Dr. James Kamara  DIAGNOSIS:    Yamilex  PRECAUTIONS:  standard precautions    HISTORY OF PRESENT ILLNESS/INJURY:  Patient was admitted to the unit due to yamilex. Patient reported that he was brought in by the police due to his sister calling them. Patient's sister stated that he was demonstrating \"unusual behavior. \" Patient reported that his mother stated that she was going to take away patient's son due to patient not being able to take care of him. Patient reported some paranoia and appeared to have some anxiety. Patient stated that he has a history of depression. Patient reportedly had a friend die about 2 weeks ago. Patient wants to be discharged. Patient was dismissive and guarded at time of evaluation. 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 LIVING SITUATION/SUPPORT:  Lives with family. EDUCATIONAL LEVEL:      MEDICATION/DRUG USE:  History of medications noncompliance. Marijuana use. Alcohol abuse. History of a DUI. LEISURE INTERESTS:    Activities with family/son, activities with friends, crossword puzzles, reading the newspaper, hunting and target shooting, electrical activities, watching TV  ACTIVITY PREFERENCE:  No preference  ACTIVITY TYPES:   Passive. Active indoor. Outdoor. COGNITION:  A&Ox3    COPING:  poor  ATTENTION:  Fair to poor concentration  RELAXATION:  Reported some paranoia and appeared to have some anxiety. SELF-ESTEEM: good  MOTIVATION:  poor    SOCIAL SKILLS:  Fair - guarded and appeared agitated  FRUSTRATION TOLERANCE:  Patient appeared agitated and wants discharged. ATTENTION SEEKING:  Yes - patient wants to go AMA.  Patient
PLAN OF CARE:     Start Time: 0845  End Time:  0930    Group Topic:  Daily Goals    Group Type:   Goal Group    Intervention/Goal:  To increase support and identify daily goals    Attendance:  Attended group    Affect:  Brightens with interaction    Behavior:  Cooperative and pleasant    Response:  appropriate
Patient attended the goal group and the recreation/social group today. Patient was social with good concentration.
given. Received admission packet:  yes. Consents reviewed, signed yes. Patient verbalize understanding:  yes. Patient education on precautions: yes           Provided pt with [employer] handout entitled \"Quitting Smoking. \"  Reviewed handout with pt addressing dangers of smoking, developing coping skills, and providing basic information about quitting. Pt response to counseling:  Pt does not want to stop smoking    41 yo male pt admitted from ED under an [facility]. Pt is upset he is here, does not believe he needs to be here and blames his mother and sister for his admission. Pt denies suicidal/homicidal thoughts or hallucinations. Pt has rapid, rambling speech, tangential, exaggerated, good eye contact, clean appearance. Pt is paranoid, upset with dr in the ED, plans to have his family member who is a  handle things. Pt oriented to his room and the unit.            Rian Alberto RN

## 2020-02-05 NOTE — PROGRESS NOTES
Behavioral Health   Discharge Note    Pt discharged with followings belongings:   Dentures: None  Vision - Corrective Lenses: None  Hearing Aid: None  Jewelry: None  Body Piercings Removed: N/A  Clothing: Other (Comment) (all items returned to patient)  Were All Patient Medications Collected?: Not Applicable  Other Valuables: None   Valuables sent home with n/a. Valuables retrieved from safe, Security envelope number:  [id number] and returned to patient. Patient left department with Departure Mode: Family member via Mobility at Departure: Ambulatory, discharged to Discharged to: Private Residence. \"An Important Message from [employer] About Your Rights\" form reviewed, signed n/a. Patient education on aftercare instructions: yes  Bridge Appointment completed: Reviewed Discharge Instructions with patient. Patient verbalizes understanding and agreement with the discharge plan using the teachback method. Patient verbalize understanding of AVS:  yes.     Status EXAM upon discharge:  Status and Exam  Normal: Yes  Facial Expression: Brightened  Affect: Appropriate  Level of Consciousness: Alert  Mood:Normal: Yes  Mood: Euphoric  Motor Activity:Normal: Yes  Motor Activity: Increased  Interview Behavior: Cooperative  Preception: [place] to Person, [place] to Time, [place] to Place, [place] to Situation  Attention:Normal: Yes  Attention: Hyperalert  Thought Processes: Circumstantial  Thought Content:Normal: Yes  Thought Content: Preoccupations  Hallucinations: None  Delusions: No  Delusions:  (denies delusions)  Memory:Normal: Yes  Memory: Poor Recent, Poor Remote  Insight and Judgment: Yes  Insight and Judgment: Poor Judgment, Poor Insight  Present Suicidal Ideation: No  Present Homicidal Ideation: No    Jorge A Blankenship RN sensation is normal and strength is normal.

## 2022-01-20 ENCOUNTER — HOSPITAL ENCOUNTER (EMERGENCY)
Age: 42
Discharge: HOME | End: 2022-01-20
Attending: FAMILY MEDICINE

## 2022-01-20 VITALS
BODY MASS INDEX: 21.47 KG/M2 | WEIGHT: 150 LBS | DIASTOLIC BLOOD PRESSURE: 94 MMHG | SYSTOLIC BLOOD PRESSURE: 135 MMHG | HEIGHT: 70 IN | TEMPERATURE: 97.7 F | OXYGEN SATURATION: 99 % | HEART RATE: 108 BPM | RESPIRATION RATE: 14 BRPM

## 2022-01-20 DIAGNOSIS — U07.1 COVID-19: Primary | ICD-10-CM

## 2022-01-20 LAB — SARS-COV-2, NAAT: DETECTED

## 2022-01-20 PROCEDURE — 87635 SARS-COV-2 COVID-19 AMP PRB: CPT

## 2022-01-20 PROCEDURE — 99283 EMERGENCY DEPT VISIT LOW MDM: CPT

## 2022-01-20 ASSESSMENT — ENCOUNTER SYMPTOMS
COUGH: 0
EYE DISCHARGE: 0
EYE REDNESS: 0
SHORTNESS OF BREATH: 0
VOMITING: 0
NAUSEA: 0
SORE THROAT: 1

## 2022-01-20 ASSESSMENT — PAIN SCALES - GENERAL: PAINLEVEL_OUTOF10: 3

## 2022-01-20 ASSESSMENT — PAIN DESCRIPTION - LOCATION: LOCATION: BACK

## 2022-01-20 NOTE — ED NOTES
Pt presents w/ 2 days of fatigue, chills, running nose, generalized achyness, cough, and sore throat. Respirations regular and easy. No distress noted.      Jacky Saavedra RN  01/20/22 [id number]

## 2022-01-20 NOTE — Clinical Note
Priti Reynolds was seen and treated in our emergency department on 1/20/2022. He may return to work on 01/29/2022. If you have any questions or concerns, please don't hesitate to call.       Snow Boyer MD

## 2022-01-20 NOTE — ED NOTES
Pt alert and oriented. Respirations regular and easy. Discharge instructions reviewed. States understanding. Pt discharged in satisfactory condition.        Tito Gasca RN  01/20/22 [id number]

## 2022-01-21 NOTE — ED PROVIDER NOTES
[facility]  eMERGENCY dEPARTMENT eNCOUnter          CHIEF COMPLAINT       Chief Complaint   Patient presents with    Fatigue    Pharyngitis       Nurses Notes reviewed and I agree except as noted in the HPI. HISTORY OF PRESENT ILLNESS    Cory Cobb is a 39 y.o. male who presents with fatigue, sore throat. Duration of symptoms about 1 to 2 days. Denies any coughing or shortness of breath. REVIEW OF SYSTEMS     Review of Systems   Constitutional: Positive for fatigue. Negative for chills and fever. HENT: Positive for sore throat. Negative for congestion. Eyes: Negative for discharge and redness. Respiratory: Negative for cough and shortness of breath. Gastrointestinal: Negative for nausea and vomiting. Musculoskeletal: Negative for arthralgias and myalgias. All other systems reviewed and are negative. PAST MEDICAL HISTORY    has a past medical history of Depression. SURGICAL HISTORY      has a past surgical history that includes Tonsillectomy. CURRENT MEDICATIONS       Discharge Medication List as of 1/20/2022  6:04 PM          ALLERGIES     is allergic to sulfa antibiotics. FAMILY HISTORY     He indicated that his mother is alive. He indicated that his father is alive. family history includes Diabetes in his mother; Substance Abuse in his father. SOCIAL HISTORY      reports that he has been smoking cigarettes. He has been smoking about 1.00 pack per day. He has never used smokeless tobacco. He reports previous drug use. He reports that he does not drink alcohol. PHYSICAL EXAM     INITIAL VITALS:  height is 5' 10\" (1.778 m) and weight is 150 lb (68 kg). His temporal temperature is 97.7 °F (36.5 °C). His blood pressure is 135/94 (abnormal) and his pulse is 108. His respiration is 14 and oxygen saturation is 99%. Physical Exam  Vitals and nursing note reviewed. Constitutional:       General: He is not in acute distress.      Appearance: He is not ill-appearing. Eyes:      Conjunctiva/sclera: Conjunctivae normal.      Pupils: Pupils are equal, round, and reactive to light. Cardiovascular:      Rate and Rhythm: Regular rhythm. Tachycardia present. Pulmonary:      Effort: Pulmonary effort is normal.      Breath sounds: Normal breath sounds. Neurological:      Mental Status: He is alert. DIFFERENTIAL DIAGNOSIS:   Covid,uri,    DIAGNOSTIC RESULTS         LABS:   Labs Reviewed   COVID-19, RAPID - Abnormal; Notable for the following components:       Result Value    SARS-CoV-2, NAAT DETECTED (*)     All other components within normal limits       EMERGENCY DEPARTMENT COURSE:   Vitals:    Vitals:    01/20/22 1727   BP: (!) 135/94   Pulse: 108   Resp: 14   Temp: 97.7 °F (36.5 °C)   TempSrc: Temporal   SpO2: 99%   Weight: 150 lb (68 kg)   Height: 5' 10\" (1.778 m)   Well-appearing, nontoxic. Rapid COVID was positive. Vital signs are relatively stable he is mildly tachycardic. Denies any shortness of breath pulse ox was 99% on room air. At this time I will recommend supportive measures he does not have any significant risk factors. Care instructions were provided. PROCEDURES:  None    FINAL IMPRESSION      1. COVID-19          DISPOSITION/PLAN   Home. Care instructions provided. Follow up with PCP or ED as needed.      PATIENT REFERRED TO:  Creasie Spurling  [address] [place] [address] [address]  [phone]    Call in 4 days  If symptoms worsen, As needed      DISCHARGE MEDICATIONS:  Discharge Medication List as of 1/20/2022  6:04 PM          (Please note that portions of this note were completed with a voice recognition program.  Efforts were made to edit the dictations but occasionally words are mis-transcribed.)    MD Antonio King MD  01/20/22 1944

## 2022-09-17 ENCOUNTER — HOSPITAL ENCOUNTER (EMERGENCY)
Age: 42
Discharge: HOME | End: 2022-09-17
Attending: FAMILY MEDICINE

## 2022-09-17 VITALS
DIASTOLIC BLOOD PRESSURE: 90 MMHG | TEMPERATURE: 97.8 F | SYSTOLIC BLOOD PRESSURE: 135 MMHG | HEART RATE: 108 BPM | RESPIRATION RATE: 98 BRPM | WEIGHT: 155 LBS | OXYGEN SATURATION: 99 % | BODY MASS INDEX: 22.19 KG/M2 | HEIGHT: 70 IN

## 2022-09-17 DIAGNOSIS — R52 BODY ACHES: ICD-10-CM

## 2022-09-17 DIAGNOSIS — B34.9 VIRAL SYNDROME: Primary | ICD-10-CM

## 2022-09-17 LAB
AMORPHOUS: ABNORMAL
BACTERIA: ABNORMAL
BILIRUBIN URINE: NEGATIVE
BLOOD, URINE: NEGATIVE
CASTS 2: ABNORMAL /LPF
CASTS UA: ABNORMAL /LPF
CHARACTER, URINE: CLEAR
COLOR: YELLOW
CRYSTALS, UA: ABNORMAL
EPITHELIAL CELLS, UA: ABNORMAL /HPF
FLU A ANTIGEN: NEGATIVE
FLU B ANTIGEN: NEGATIVE
GLUCOSE, URINE: NEGATIVE MG/DL
KETONES, URINE: NEGATIVE
LEUKOCYTE ESTERASE, URINE: NEGATIVE
MUCUS: ABNORMAL
NITRITE, URINE: NEGATIVE
PH UA: 6 (ref 5–9)
PROTEIN UA: ABNORMAL MG/DL
RBC UA: ABNORMAL /HPF
REFLEX TO URINE C & S: ABNORMAL
SARS-COV-2, NAAT: NOT  DETECTED
SPECIFIC GRAVITY UA: 1.01 (ref 1–1.03)
UROBILINOGEN, URINE: 0.2 EU/DL (ref 0–1)
WBC UA: ABNORMAL /HPF

## 2022-09-17 PROCEDURE — 81001 URINALYSIS AUTO W/SCOPE: CPT

## 2022-09-17 PROCEDURE — 99283 EMERGENCY DEPT VISIT LOW MDM: CPT

## 2022-09-17 PROCEDURE — 87635 SARS-COV-2 COVID-19 AMP PRB: CPT

## 2022-09-17 PROCEDURE — 87804 INFLUENZA ASSAY W/OPTIC: CPT

## 2022-09-17 ASSESSMENT — ENCOUNTER SYMPTOMS
SORE THROAT: 0
COLOR CHANGE: 0
NAUSEA: 0
SHORTNESS OF BREATH: 0
DIARRHEA: 1
VOMITING: 0
COUGH: 1

## 2022-09-17 ASSESSMENT — PAIN - FUNCTIONAL ASSESSMENT: PAIN_FUNCTIONAL_ASSESSMENT: 0-10

## 2022-09-17 ASSESSMENT — PAIN SCALES - GENERAL: PAINLEVEL_OUTOF10: 3

## 2022-09-17 ASSESSMENT — PAIN DESCRIPTION - LOCATION: LOCATION: GENERALIZED

## 2022-09-18 NOTE — ED PROVIDER NOTES
[facility]  eMERGENCY dEPARTMENT eNCOUnter          CHIEF COMPLAINT       Chief Complaint   Patient presents with    Generalized Body Aches     Pt c/o generalized bodyaches started two days ago, one episode of diarrhea PTA       Nurses Notes reviewed and I agree except as noted in the HPI. HISTORY OF PRESENT ILLNESS    Milana Meza is a 43 y.o. male who presents for generalized body aches, an episode of diarrhea, and mild cough. Symptoms started a few days ago. Denies any alleviating measures. REVIEW OF SYSTEMS     Review of Systems   Constitutional:  Negative for chills and fever. HENT:  Negative for congestion and sore throat. Respiratory:  Positive for cough. Negative for shortness of breath. Gastrointestinal:  Positive for diarrhea. Negative for nausea and vomiting. Musculoskeletal:  Positive for myalgias. Negative for arthralgias. Skin:  Negative for color change and rash. All other systems reviewed and are negative. PAST MEDICAL HISTORY    has a past medical history of Depression. SURGICAL HISTORY      has a past surgical history that includes Tonsillectomy. CURRENT MEDICATIONS     There are no discharge medications for this patient. ALLERGIES     is allergic to sulfa antibiotics. FAMILY HISTORY     He indicated that his mother is alive. He indicated that his father is alive. family history includes Diabetes in his mother; Substance Abuse in his father. SOCIAL HISTORY      reports that he has been smoking cigarettes. He has been smoking an average of 1 pack per day. He has never used smokeless tobacco. He reports that he does not currently use drugs. He reports that he does not drink alcohol. PHYSICAL EXAM     INITIAL VITALS:  height is 5' 10\" (1.778 m) and weight is 155 lb (70.3 kg). His temporal temperature is 97.8 °F (36.6 °C). His blood pressure is 135/90 (abnormal) and his pulse is 108 (abnormal).  His respiration is 98 (abnormal) and oxygen saturation is 99%. Physical Exam  Vitals and nursing note reviewed. Constitutional:       General: He is not in acute distress. Appearance: He is not ill-appearing. HENT:      Nose: Nose normal.   Eyes:      Conjunctiva/sclera: Conjunctivae normal.      Pupils: Pupils are equal, round, and reactive to light. Cardiovascular:      Rate and Rhythm: Regular rhythm. Tachycardia present. Pulmonary:      Effort: Pulmonary effort is normal.      Breath sounds: Normal breath sounds. Musculoskeletal:      Cervical back: Normal range of motion and neck supple. Lymphadenopathy:      Cervical: No cervical adenopathy. Skin:     General: Skin is dry. Coloration: Skin is not pale. Findings: No rash. Neurological:      General: No focal deficit present. Mental Status: He is alert and oriented to person, place, and time. DIFFERENTIAL DIAGNOSIS:   Viral illness not otherwise specified, COVID, influenza,    DIAGNOSTIC RESULTS     EKG: All EKG's are interpreted by the Emergency Department Physician who either signs or Co-signs this chart in the absence of a cardiologist.      RADIOLOGY: non-plain film images(s) such as CT, Ultrasound and MRI are read by the radiologist.      LABS:   Labs Reviewed   URINALYSIS WITH REFLEX TO CULTURE - Abnormal; Notable for the following components:       Result Value    Protein, UA TRACE (*)     All other components within normal limits   COVID-19, RAPID   RAPID INFLUENZA A/B ANTIGENS       EMERGENCY DEPARTMENT COURSE:   Vitals:    Vitals:    09/17/22 2032 09/17/22 2128   BP: (!) 141/105 (!) 135/90   Pulse: (!) 108    Resp: 20 (!) 98   Temp: 97.8 °F (36.6 °C)    TempSrc: Temporal    SpO2: 99% 99%   Weight: 155 lb (70.3 kg)    Height: 5' 10\" (1.778 m)      Well-appearing, nontoxic. Afebrile. No focal findings on exam.  Rapid COVID negative. Rapid influenza negative. UA is unremarkable for findings.   At this time symptoms seem to be more consistent with a viral syndrome. I did recommend repeating COVID testing in 1 to 2 days. At this time we will provide supportive measures increasing fluids, Tylenol for fever. CRITICAL CARE:       CONSULTS:      PROCEDURES:  None    FINAL IMPRESSION      1. Viral syndrome    2. Body aches          DISPOSITION/PLAN   Home. Care instructions provided. Follow-up with PCP or ED as needed. PATIENT REFERRED TO:  Gracie Rousseau MD  [address] [place] [address],[address] [phone]    Call in 2 days  If symptoms worsen, As needed      DISCHARGE MEDICATIONS:  There are no discharge medications for this patient.       (Please note that portions of this note were completed with a voice recognition program.  Efforts were made to edit the dictations but occasionally words are mis-transcribed.)    MD Pilar Meza MD  09/17/22 8456